# Patient Record
Sex: MALE | URBAN - METROPOLITAN AREA
[De-identification: names, ages, dates, MRNs, and addresses within clinical notes are randomized per-mention and may not be internally consistent; named-entity substitution may affect disease eponyms.]

---

## 2023-04-14 ENCOUNTER — EMERGENCY (EMERGENCY)
Facility: HOSPITAL | Age: 28
LOS: 1 days | Discharge: HOME | End: 2023-04-14
Attending: EMERGENCY MEDICINE | Admitting: EMERGENCY MEDICINE
Payer: SELF-PAY

## 2023-04-14 VITALS
TEMPERATURE: 98 F | RESPIRATION RATE: 18 BRPM | DIASTOLIC BLOOD PRESSURE: 89 MMHG | SYSTOLIC BLOOD PRESSURE: 142 MMHG | OXYGEN SATURATION: 100 % | HEART RATE: 96 BPM

## 2023-04-14 VITALS
DIASTOLIC BLOOD PRESSURE: 93 MMHG | OXYGEN SATURATION: 98 % | RESPIRATION RATE: 16 BRPM | HEART RATE: 110 BPM | SYSTOLIC BLOOD PRESSURE: 162 MMHG

## 2023-04-14 PROCEDURE — 99053 MED SERV 10PM-8AM 24 HR FAC: CPT

## 2023-04-14 PROCEDURE — 99284 EMERGENCY DEPT VISIT MOD MDM: CPT

## 2023-04-14 RX ORDER — HALOPERIDOL DECANOATE 100 MG/ML
5 INJECTION INTRAMUSCULAR ONCE
Refills: 0 | Status: COMPLETED | OUTPATIENT
Start: 2023-04-14 | End: 2023-04-14

## 2023-04-14 RX ADMIN — Medication 25 MILLIGRAM(S): at 07:22

## 2023-04-14 RX ADMIN — Medication 2 MILLIGRAM(S): at 01:00

## 2023-04-14 RX ADMIN — HALOPERIDOL DECANOATE 5 MILLIGRAM(S): 100 INJECTION INTRAMUSCULAR at 01:00

## 2023-04-14 NOTE — ED PROVIDER NOTE - PROGRESS NOTE DETAILS
Daryl: Awake. BP unremarkable. . Declines water to drink. Able to walk mostly steady once he gets momentun. Will give librium 25 mg PO. Await further sobriety. Patient reassessed at this time, able to ambulate without difficulty.  Able to tolerate p.o. without difficulty.  Patient states that he would like to leave.  Repeat vitals notable for mild tachycardia but otherwise asymptomatic.  CIWA score is less than 2.  States that he drinks every day. Patient educated on alcohol abuse and resources provided.  Strict return precautions relayed to patient.  All questions were answered.

## 2023-04-14 NOTE — ED PROVIDER NOTE - OBJECTIVE STATEMENT
Daryl: [facility], found on the ground clinically intoxicated w/ ETOH. No e/o significant injury. Not cooperating w/ EMS. Got Ativan and Haldol.

## 2023-04-14 NOTE — ED PROVIDER NOTE - CLINICAL SUMMARY MEDICAL DECISION MAKING FREE TEXT BOX
Daryl: Likely ETOH intoxication. Uncooperative w/ healthcare staff. Give Ativan/Haldol. Re-assess. No e/o significant injury.

## 2023-04-14 NOTE — ED PROVIDER NOTE - PATIENT PORTAL LINK FT
You can access the FollowMyHealth Patient Portal offered by [facility] by registering at the following website: [email or web address]. By joining [employer]’s FollowMyHealth portal, you will also be able to view your health information using other applications (apps) compatible with our system.

## 2023-04-14 NOTE — ED PROVIDER NOTE - NSFOLLOWUPINSTRUCTIONS_ED_ALL_ED_FT
-You were seen in the Emergency Department (ED) for alcohol intoxication. Lab and imaging results, if performed, were discussed with you along with your discharge diagnosis.    FOLLOW-UP:  -Please follow up with your PMD if symptoms return or for any concerning matter pertaining to your Alcohol Intoxication.  -Please follow up with your private physician within the next 72 hours. Tell them you were recently in the ED for an urgent issue and would like to be seen. Bring copies of your results if you were given.   -If you do not have a PMD, please call [phone] to find one convenient for you or call our clinic at [phone].    MEDICATIONS:  -Continue all other prescribed medicine, IF ANY, as per your primary care doctor's (PMD) recommendations.    PAIN CONTROL:  -Please take over the counter Tylenol (also known as acetaminophen) 650mg every 6 hours or Ibuprofen (also known as motrin, advil) 600mg every 8 hour for your pain, IF ANY, unless you are not supposed to for any reason.  -Rest, stay hydrated with plenty of fluids (drink at least 2 Liters or 64 Ounces of water each day UNLESS you are supposed to restrict fluids or ANY reason.    RETURN PRECAUTIONS:  -Please return to the Emergency Department if you experience ANY new or concerning symptoms, such as, but not limited to: worsening pain, large amount of bleeding, passing out, fever >100.F, shaking chills, inability to see or new double vision, chest pain, difficulty breathing, diffuse abdominal pain, unable to eat or drink, continuous vomiting or diarrhea, unable to move or feel part of your body

## 2023-04-14 NOTE — ED ADULT NURSE REASSESSMENT NOTE - NS ED NURSE REASSESS COMMENT FT1
Pt appears drunk at this time, trying to grab on to staff and uncooperative at this time. Belongings secured, medicated per order, sent back to [facility].
Pt awake at this time, still appears intoxicated, ambulated 15 ft with assistance from MD Loving. Pt mildly unsteady with gait. No acute distress noted, denies complaints. Respirations even and unlabored.
Pt received on stretcher.  Pt in NAD.  Pt sleeping but able to wake up to voice.  Pt offering no complaints at this time.  Awaiting dispo by MD.
Pt received from break RN. Pt sleeping in stretcher, bilateral chest rise noted. No acute distress. BH clothing on.

## 2023-04-14 NOTE — ED ADULT NURSE NOTE - OBJECTIVE STATEMENT
Break RN note- Patient arrives to the ED from home for intox per triage note. Patient resting quietly in bed, breathing even and nonlabored. No acute distress. Patient appears comfortable. No complaints at this time. Patient not answering questions at this time. Safety maintained. Awaiting further MD instruction. Patient stable upon exiting the room.

## 2023-04-14 NOTE — ED PROVIDER NOTE - PHYSICAL EXAMINATION
Well-appearing, well nourished, awake, alert, agitated, in no apparent distress.    Airway patent. Neck supple. No e/o head injury.    Eyes without scleral injection. No jaundice.    Strong pulse.    Respirations unlabored.    Abdomen soft, non-tender, no guarding.    MSK: Spine appears normal, range of motion is not limited, no muscle or joint tenderness.    Alert and agitated, no gross motor or sensory deficits.    Skin: normal color for race, warm, dry and intact. No evidence of rash. Perhaps a small abrasion (vs. dry, peeling skin) in L palmar hand hypothenar area).    Psych: unable to assess SI/HI given agitation Well-appearing, well nourished, awake, alert, agitated, in no apparent distress.    Airway patent. Neck supple. No e/o head injury.    Eyes without scleral injection. No jaundice. Pupils (B) 3 mm and reactive.    Strong pulse.    Respirations unlabored.    Abdomen soft, non-tender, no guarding.    MSK: Spine appears normal, range of motion is not limited, no muscle or joint tenderness.    Alert and agitated, no gross motor or sensory deficits.    Skin: normal color for race, warm, dry and intact. No evidence of rash. Small abrasion only through epidermis (vs. dry, peeling skin) in L palmar hand hypothenar area).    Psych: unable to assess SI/HI given agitation

## 2023-04-14 NOTE — ED PROVIDER NOTE - ADDITIONAL NOTES AND INSTRUCTIONS:
Please excuse from work/school as he/she was being evaluated in the Emergency Room at [facility].